# Patient Record
Sex: MALE | Race: WHITE
[De-identification: names, ages, dates, MRNs, and addresses within clinical notes are randomized per-mention and may not be internally consistent; named-entity substitution may affect disease eponyms.]

---

## 2018-03-27 ENCOUNTER — HOSPITAL ENCOUNTER (OUTPATIENT)
Dept: HOSPITAL 47 - SLEEP | Age: 38
Discharge: HOME | End: 2018-03-27
Payer: COMMERCIAL

## 2018-03-27 DIAGNOSIS — F17.200: ICD-10-CM

## 2018-03-27 DIAGNOSIS — G47.33: Primary | ICD-10-CM

## 2018-03-27 DIAGNOSIS — Z83.6: ICD-10-CM

## 2018-03-27 PROCEDURE — 99211 OFF/OP EST MAY X REQ PHY/QHP: CPT

## 2018-03-27 NOTE — CONS
CONSULTATION



REASON FOR CONSULTATION:

Sleep apnea.



This is a 37-year-old male patient who was very much concerned about sleep apnea.  He

was referred to me for further evaluation.  He snores. He has witnessed apneas as

reported by his wife.  He is tired. He is an active sleeper. He does not go into deep

stages of sleep and occasionally sleepwalks.  He is very tired and sleepy during the

day.  He has a brother and father, both of whom have sleep apnea, and they are on

treatment with CPAP.  He goes to bed around 9 p.m., wakes up at 4 a.m. in the morning.

He is an  and works on electrical poles. He never falls asleep while driving

or doing his day-to-day activities at work.  His Seneca score is 12.  No recent weight

gain.



PAST MEDICAL HISTORY:

Negative.



SURGICAL HISTORY:

Negative.



DRUG ALLERGIES:

NOT KNOWN.



MEDICATIONS:

None.



SOCIAL HISTORY:

Smoker. No history of alcoholism. No history of IV drugs.



FAMILY HISTORY:

Brother and father have obstructive sleep apnea.



REVIEW OF SYSTEMS:

Twelve-point review of systems was done.  No insomnia. He denies waking up choking.

Occasionally he has sleepwalking, especially when he feels very tired and sleepy.  No

restlessness in lower extremities.  No anxiety or panic attacks.  No palpitation. No

heartburn. No claustrophobia. No sexual dysfunction.  No depression.



PHYSICAL EXAMINATION:

/87, pulse 80, respiration 16, temperature 97.5, saturation 96% on room air.

Weight is 252.  Height is 72 inches.  Seneca score 12.

GENERAL APPEARANCE:  Calm, comfortable.

Head is atraumatic, normocephalic.

Neck is short. Mallampati class IV.  There is no goiter or neck masses.

LUNGS:  Clear to auscultation.

HEART:  Heart sounds are regular rate and rhythm. Normal S1, S2.  No S3, S4.  No

murmurs.

ABDOMEN:  Soft, nontender.  No organomegaly.

EXTREMITIES:  No edema.  No cyanosis or clubbing.

NEUROLOGIC: Alert and oriented x3. There is no focal neurological deficit.

PSYCHIATRIC:  Negative for anxiety or depression.

SKIN: Negative for any wounds or ulceration.



IMPRESSION:

1. Obstructive sleep apnea strongly suspected on clinical grounds.  Currently needs

    further investigation.

2. Daytime drowsiness with an Seneca score of 12.

3. Mallampati class IV.

4. Positive family history of obstructive sleep apnea.



PLAN:

1. Encourage weight loss.

2. Proceed with screening polysomnography to assess for any sleep breathing disorder

    and treat accordingly.





CAROLYNL / IJN: [id number] / Job#: [id number]

## 2018-09-20 ENCOUNTER — HOSPITAL ENCOUNTER (OUTPATIENT)
Dept: HOSPITAL 47 - RADCTMAIN | Age: 38
Discharge: HOME | End: 2018-09-20
Attending: INTERNAL MEDICINE
Payer: COMMERCIAL

## 2018-09-20 DIAGNOSIS — J20.8: Primary | ICD-10-CM

## 2018-09-20 PROCEDURE — 71260 CT THORAX DX C+: CPT

## 2018-09-20 NOTE — CT
EXAMINATION TYPE: CT chest w con

 

DATE OF EXAM: 9/20/2018

 

COMPARISON: None

 

HISTORY: Bronchospasm

 

CT DLP: 601 mGycm

Automated exposure control for dose reduction was used.

 

CONTRAST: 

CT scan of the chest is performed with IV Contrast, patient injected with 100 ml mL of Isovue 300.

 

FINDINGS:

 

LUNGS: The lungs are grossly clear, there is no concerning parenchymal mass or nodule identified.   T
here is no pleural effusion or pneumothorax seen.  The tracheobronchial tree is patent.

 

MEDIASTINUM: There are no greater than 1 cm hilar or mediastinal lymph nodes.   No pericardial effusi
on is seen.  Thoracic aorta is of normal caliber. The heart is not enlarged. Calcified hilar mediasti
nal lymph nodes.

 

UPPER ABDOMEN:  No significant abnormality appreciated.

 

OTHER:  No additional significant abnormality is seen.

 

IMPRESSION:  No distinct abnormality appreciated at this time.

## 2019-09-27 ENCOUNTER — HOSPITAL ENCOUNTER (OUTPATIENT)
Dept: HOSPITAL 47 - RADUSWWP | Age: 39
Discharge: HOME | End: 2019-09-27
Attending: INTERNAL MEDICINE
Payer: COMMERCIAL

## 2019-09-27 DIAGNOSIS — R74.8: Primary | ICD-10-CM

## 2019-09-27 LAB
ALBUMIN SERPL-MCNC: 4.3 G/DL (ref 3.5–5)
ALP SERPL-CCNC: 56 U/L (ref 38–126)
ALT SERPL-CCNC: 142 U/L (ref 21–72)
AST SERPL-CCNC: 58 U/L (ref 17–59)
BILIRUB INDIRECT SERPL-MCNC: 0.6 MG/DL (ref 0–1.1)
BILIRUBIN DIRECT+TOT PNL SERPL-MCNC: 0.2 MG/DL (ref 0–0.2)
FERRITIN SERPL-MCNC: 353 NG/ML (ref 22–322)
INR PPP: 1.1 (ref ?–1.2)
PROT SERPL-MCNC: 7.1 G/DL (ref 6.3–8.2)
PT BLD: 11.5 SEC (ref 9–12)

## 2019-09-27 PROCEDURE — 85610 PROTHROMBIN TIME: CPT

## 2019-09-27 PROCEDURE — 80076 HEPATIC FUNCTION PANEL: CPT

## 2019-09-27 PROCEDURE — 82390 ASSAY OF CERULOPLASMIN: CPT

## 2019-09-27 PROCEDURE — 83550 IRON BINDING TEST: CPT

## 2019-09-27 PROCEDURE — 86038 ANTINUCLEAR ANTIBODIES: CPT

## 2019-09-27 PROCEDURE — 82728 ASSAY OF FERRITIN: CPT

## 2019-09-27 PROCEDURE — 83516 IMMUNOASSAY NONANTIBODY: CPT

## 2019-09-27 PROCEDURE — 84165 PROTEIN E-PHORESIS SERUM: CPT

## 2019-09-27 PROCEDURE — 83540 ASSAY OF IRON: CPT

## 2019-09-27 PROCEDURE — 76705 ECHO EXAM OF ABDOMEN: CPT

## 2019-09-30 LAB
ALBUMIN SERPL ELPH-MCNC: 4.12 G/DL (ref 3.8–4.9)
GAMMA GLOB SERPL ELPH-MCNC: 0.9 G/DL (ref 0.7–1.5)

## 2023-01-24 NOTE — US
EXAMINATION TYPE: US abdomen limited

 

DATE OF EXAM: 9/27/2019

 

COMPARISON: NONE

 

CLINICAL HISTORY: R74.8 Abnormal levels other serum enzymes. no symptoms

 

EXAM MEASUREMENTS:

 

Liver Length:  16.5 cm   

Gallbladder Wall:  2 cm   

CBD:  0.4 cm

Right Kidney:  13.6 x 6.8 x 6.7  cm

 

 

 

Pancreas:  wnl

Liver:  focal fatty sparring seen near gallbladder, difficult to penetrate   

Gallbladder:  sludge seen 

**Evidence for sonographic Hdz's sign:  no

CBD:  wnl 

Right Kidney:  large in size, otherwise wnl 

 

 

 

IMPRESSION: 

1. Normal right upper quadrant ultrasound Xeljanz Counseling: I discussed with the patient the risks of Xeljanz therapy including increased risk of infection, liver issues, headache, diarrhea, or cold symptoms. Live vaccines should be avoided. They were instructed to call if they have any problems.